# Patient Record
Sex: MALE | URBAN - METROPOLITAN AREA
[De-identification: names, ages, dates, MRNs, and addresses within clinical notes are randomized per-mention and may not be internally consistent; named-entity substitution may affect disease eponyms.]

---

## 2019-05-28 ENCOUNTER — OFFICE VISIT (OUTPATIENT)
Dept: URBAN - METROPOLITAN AREA CLINIC 22 | Facility: CLINIC | Age: 34
End: 2019-05-28
Payer: COMMERCIAL

## 2019-05-28 PROCEDURE — 92014 COMPRE OPH EXAM EST PT 1/>: CPT | Performed by: OPTOMETRIST

## 2019-05-28 PROCEDURE — 92015 DETERMINE REFRACTIVE STATE: CPT | Performed by: OPTOMETRIST

## 2019-05-28 PROCEDURE — 92004 COMPRE OPH EXAM NEW PT 1/>: CPT | Performed by: OPTOMETRIST

## 2019-05-28 ASSESSMENT — VISUAL ACUITY
OD: 20/25
OS: 20/25

## 2019-05-28 ASSESSMENT — INTRAOCULAR PRESSURE
OS: 17
OD: 17

## 2019-05-28 NOTE — IMPRESSION/PLAN
Impression: Regular astigmatism, bilateral: H52.223. OU. Plan: Refractive error accounts for symptoms. Release SRX. ocular structures appear healthy today. RTC 1 year x CEE.

## 2022-03-21 ENCOUNTER — OFFICE VISIT (OUTPATIENT)
Dept: URBAN - METROPOLITAN AREA CLINIC 22 | Facility: CLINIC | Age: 37
End: 2022-03-21
Payer: COMMERCIAL

## 2022-03-21 DIAGNOSIS — H52.223 REGULAR ASTIGMATISM, BILATERAL: Primary | ICD-10-CM

## 2022-03-21 PROCEDURE — 92014 COMPRE OPH EXAM EST PT 1/>: CPT | Performed by: STUDENT IN AN ORGANIZED HEALTH CARE EDUCATION/TRAINING PROGRAM

## 2022-03-21 ASSESSMENT — INTRAOCULAR PRESSURE
OD: 20
OS: 18

## 2022-03-21 ASSESSMENT — VISUAL ACUITY
OS: 20/25
OD: 20/30